# Patient Record
Sex: FEMALE | Race: WHITE
[De-identification: names, ages, dates, MRNs, and addresses within clinical notes are randomized per-mention and may not be internally consistent; named-entity substitution may affect disease eponyms.]

---

## 2017-08-29 ENCOUNTER — HOSPITAL ENCOUNTER (OUTPATIENT)
Dept: HOSPITAL 80 - FIMAGING | Age: 67
End: 2017-08-29
Attending: PHYSICIAN ASSISTANT
Payer: COMMERCIAL

## 2017-08-29 DIAGNOSIS — M19.012: ICD-10-CM

## 2017-08-29 DIAGNOSIS — S46.012A: Primary | ICD-10-CM

## 2017-08-29 DIAGNOSIS — M75.82: ICD-10-CM

## 2017-08-29 DIAGNOSIS — M75.22: ICD-10-CM

## 2017-08-29 DIAGNOSIS — S43.492A: ICD-10-CM

## 2018-01-15 ENCOUNTER — HOSPITAL ENCOUNTER (EMERGENCY)
Dept: HOSPITAL 80 - CED | Age: 68
Discharge: HOME | End: 2018-01-15
Payer: COMMERCIAL

## 2018-01-15 VITALS
RESPIRATION RATE: 16 BRPM | TEMPERATURE: 97.7 F | HEART RATE: 107 BPM | OXYGEN SATURATION: 98 % | DIASTOLIC BLOOD PRESSURE: 74 MMHG | SYSTOLIC BLOOD PRESSURE: 158 MMHG

## 2018-01-15 DIAGNOSIS — I10: ICD-10-CM

## 2018-01-15 DIAGNOSIS — S69.91XA: Primary | ICD-10-CM
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
## 2018-01-15 DIAGNOSIS — Z87.891: ICD-10-CM

## 2018-01-15 NOTE — EDPHY
H & P


Time Seen by Provider: 01/15/18 17:53


HPI/ROS: 





This patient describes a mechanical fall on ice to outstretched right hand with 

pain in the region of the distal radius and anatomical snuffbox.  The incident 

occurred at 4:30 p.m. today and she came in by private vehicle for evaluation 

of her injury. She reports moderate pain to the area and has not had any pain 

medication prior to arrival.  She did bump her head in addition but denies any 

LOC or feeling of being dazed.  She has no headache.





ROS:  Neuro:  No numbness or tingling.


Musculoskeletal:  No other injuries


Integumentary:  No lacerations or abrasions.


Cardiovascular:  No palpitations or chest pain.


5 point ROS is otherwise negative.


Past Medical/Surgical History: 





History of arthritis with a thumb surgery her right hand in June by Dr. pantoja's





Hypothyroidism, hypertension


Smoking Status: Former smoker


Physical Exam: 





Physical Exam


Vital signs are normal.


General:  No acute distress


HEENT:  Atraumatic except for minimal occipital tenderness with no hematoma.


Neck:  No midline tenderness


Eyes:  Pupils equal and react to light.  Extraocular motions are intact.


Lungs:  No respiratory distress.


Cardiac:  Brisk capillary refill is intact throughout.  Pulses are 2+ and 

symmetric in the affected extremity.


Skin:  No rash or pallor.


Extremities:  Atraumatic normal except for right wrist


Right wrist:  Patient has anatomical snuffbox tenderness-moderate as well as 

distal radius tenderness dorsally.  No ulnar styloid tenderness or significant 

volar tenderness. Patient also has increased pain in the region the anatomical 

snuffbox with axial loading of the right thumb.


Neuro:  Alert and oriented x3 with no sensorimotor deficits.





Initial differential diagnosis:  Scaphoid fracture, distal radius fracture, 

wrist sprain


Constitutional: 


 Initial Vital Signs











Temperature (C)  36.5 C   01/15/18 17:42


 


Heart Rate  107 H  01/15/18 17:42


 


Respiratory Rate  16   01/15/18 17:42


 


Blood Pressure  158/74 H  01/15/18 17:42


 


O2 Sat (%)  98   01/15/18 17:42








 











O2 Delivery Mode               Room Air














Allergies/Adverse Reactions: 


 





cephalexin monohydrate [From Keflex] Allergy (Verified 01/15/18 17:47)


 RASH/GI


meperidine HCl [From Demerol] Allergy (Verified 01/15/18 17:47)


 Hives


Penicillins Allergy (Verified 01/15/18 17:47)


 Rash


Sulfa (Sulfonamide Antibiotics) Allergy (Verified 01/15/18 17:47)


 Rash








Home Medications: 














 Medication  Instructions  Recorded


 


DULoxetine [Cymbalta] 30 mg PO DAILY 04/08/15


 


Esomeprazole Magnesium [Nexium 20 mg PO DAILY 04/08/15





24Hr]  


 


Galantamine HBr [Galantamine ER] 16 mg PO DAILY06 04/08/15


 


Glucosamine HCl 500 mg PO DAILY 04/08/15


 


Levothyroxine [Synthroid] 88 mcg PO DAILY06 04/08/15


 


Methylphenidate HCl [RITALIN LA] 40 mg PO DAILY06 04/08/15


 


Montelukast Sodium [Singulair] 10 mg PO DAILY@1800 04/08/15


 


Multivitamins [Tab-A-Erwin] 1 each PO DAILY 04/08/15


 


Omega-3 Fatty Acids [Fish Oil] 1,000 mg PO DAILY 04/08/15


 


methYLPHENIDATE HCL [Ritalin] 10 mg PO DAILY 04/08/15














MDM/Departure





- MDM


Diagnostics: 





Small bone chip present at the base of the 1st metacarpal-rounded question 

acute versus chronic along with postop changes with hardware present in 1st and 

2nd metacarpals.  otherwise normal x-rays by my interpretation


Imaging Results: 


 Imaging Impressions





Wrist X-Ray  01/15/18 17:59


Impression: Suspect nonacute bone chip at the base of the first metacarpal. 

Otherwise negative for acute posttraumatic change. Please see above.











Imaging: I viewed and interpreted images myself


Medications Given: 


 








Discontinued Medications





Ibuprofen (Motrin)  600 mg PO EDNOW ONE


   Stop: 01/15/18 17:59


   Last Admin: 01/15/18 18:17 Dose:  600 mg





ED Course/Re-evaluation: 





The patient is a nurse practitioner.  I reviewed her x-ray with her and 

explained that concern for potential acute chip fracture at the base of the 1st 

metacarpal.  She declines Ortho Glass splint here preferring to use her own 

Velcro plastic thumb spica splint.





She will wear this when she is up and about and follow up with Dr. pantoja-her 

orthopedic physician this week for recheck and further evaluation.  After 

ibuprofen 600 mg here her pain went from 8/10 initially to 4/10.  She declined 

any further analgesics.





Discussion:  Patient with thumb sprain and chronic postop bone chip versus 

acute avulsion fracture at base of 1st metacarpal.  I counseled the patient 

regarding this.





- Depart


Disposition: Home, Routine, Self-Care


Clinical Impression: 


Thumb injury


Qualifiers:


 Encounter type: initial encounter Laterality: right Qualified Code(s): 

S69.91XA - Unspecified injury of right wrist, hand and finger(s), initial 

encounter





Condition: Good


Instructions:  Hand Sprain (ED)


Additional Instructions: 


Diagnosis:  Thumb injury





There is a small bone chip on your x-ray at the base of the thumb that may be 

old but may be related to today's fall.  Cannot rule out acute fracture.





Plan:  Wear the splint whenever up and about.





Ibuprofen Tylenol for pain control





Ice 20 min at a time 3 times a day for the next few days in addition





Limit activity with the affected hand





Call Dr. master's to arrange follow-up appointment for recheck sometime within 

the next 2-5 days.





Return for any significant worsening despite the treatment plan


Referrals: 


China Roach MD [Primary Care Provider] - As per Instructions


Leonidas Pantoja MD [Medical Doctor] - As per Instructions

## 2018-08-23 ENCOUNTER — HOSPITAL ENCOUNTER (OUTPATIENT)
Dept: HOSPITAL 80 - FIMAGING | Age: 68
End: 2018-08-23
Payer: COMMERCIAL

## 2018-08-23 DIAGNOSIS — M75.102: Primary | ICD-10-CM

## 2018-08-23 DIAGNOSIS — M77.9: ICD-10-CM

## 2019-04-11 ENCOUNTER — HOSPITAL ENCOUNTER (OUTPATIENT)
Dept: HOSPITAL 80 - EMCIMAGING | Age: 69
End: 2019-04-11
Attending: GENERAL ACUTE CARE HOSPITAL
Payer: COMMERCIAL

## 2019-04-11 DIAGNOSIS — S62.511A: Primary | ICD-10-CM
